# Patient Record
Sex: FEMALE | Race: OTHER
[De-identification: names, ages, dates, MRNs, and addresses within clinical notes are randomized per-mention and may not be internally consistent; named-entity substitution may affect disease eponyms.]

---

## 2020-05-19 ENCOUNTER — HOSPITAL ENCOUNTER (INPATIENT)
Dept: HOSPITAL 54 - ER | Age: 65
LOS: 10 days | Discharge: NURSING, REHAB OR LONG-TERM CARE | DRG: 885 | End: 2020-05-29
Attending: NURSE PRACTITIONER | Admitting: PSYCHIATRY & NEUROLOGY
Payer: MEDICARE

## 2020-05-19 VITALS — SYSTOLIC BLOOD PRESSURE: 102 MMHG | DIASTOLIC BLOOD PRESSURE: 57 MMHG

## 2020-05-19 VITALS — BODY MASS INDEX: 21.99 KG/M2 | HEIGHT: 65 IN | WEIGHT: 132 LBS

## 2020-05-19 DIAGNOSIS — F29: ICD-10-CM

## 2020-05-19 DIAGNOSIS — E44.1: ICD-10-CM

## 2020-05-19 DIAGNOSIS — Z91.14: ICD-10-CM

## 2020-05-19 DIAGNOSIS — M62.562: ICD-10-CM

## 2020-05-19 DIAGNOSIS — Z90.49: ICD-10-CM

## 2020-05-19 DIAGNOSIS — Z73.6: ICD-10-CM

## 2020-05-19 DIAGNOSIS — M20.41: ICD-10-CM

## 2020-05-19 DIAGNOSIS — R26.2: ICD-10-CM

## 2020-05-19 DIAGNOSIS — N39.0: ICD-10-CM

## 2020-05-19 DIAGNOSIS — M20.42: ICD-10-CM

## 2020-05-19 DIAGNOSIS — L60.1: ICD-10-CM

## 2020-05-19 DIAGNOSIS — F60.0: ICD-10-CM

## 2020-05-19 DIAGNOSIS — Z72.0: ICD-10-CM

## 2020-05-19 DIAGNOSIS — M62.561: ICD-10-CM

## 2020-05-19 DIAGNOSIS — F31.64: Primary | ICD-10-CM

## 2020-05-19 DIAGNOSIS — B35.1: ICD-10-CM

## 2020-05-19 LAB
ALBUMIN SERPL BCP-MCNC: 3.7 G/DL (ref 3.4–5)
ALP SERPL-CCNC: 105 U/L (ref 46–116)
ALT SERPL W P-5'-P-CCNC: 18 U/L (ref 12–78)
APAP SERPL-MCNC: 0 UG/ML (ref 10–30)
AST SERPL W P-5'-P-CCNC: 11 U/L (ref 15–37)
BASOPHILS # BLD AUTO: 0.1 /CMM (ref 0–0.2)
BASOPHILS NFR BLD AUTO: 0.8 % (ref 0–2)
BILIRUB DIRECT SERPL-MCNC: 0.1 MG/DL (ref 0–0.2)
BILIRUB SERPL-MCNC: 0.3 MG/DL (ref 0.2–1)
BUN SERPL-MCNC: 13 MG/DL (ref 7–18)
CALCIUM SERPL-MCNC: 9.5 MG/DL (ref 8.5–10.1)
CHLORIDE SERPL-SCNC: 104 MMOL/L (ref 98–107)
CO2 SERPL-SCNC: 27 MMOL/L (ref 21–32)
CREAT SERPL-MCNC: 0.8 MG/DL (ref 0.6–1.3)
EOSINOPHIL NFR BLD AUTO: 1.1 % (ref 0–6)
ETHANOL SERPL-MCNC: < 3 MG/DL (ref 0–0)
GLUCOSE SERPL-MCNC: 86 MG/DL (ref 74–106)
HCT VFR BLD AUTO: 46 % (ref 33–45)
HGB BLD-MCNC: 14.8 G/DL (ref 11.5–14.8)
LYMPHOCYTES NFR BLD AUTO: 2.4 /CMM (ref 0.8–4.8)
LYMPHOCYTES NFR BLD AUTO: 20.8 % (ref 20–44)
MCHC RBC AUTO-ENTMCNC: 32 G/DL (ref 31–36)
MCV RBC AUTO: 93 FL (ref 82–100)
MONOCYTES NFR BLD AUTO: 0.9 /CMM (ref 0.1–1.3)
MONOCYTES NFR BLD AUTO: 8.3 % (ref 2–12)
NEUTROPHILS # BLD AUTO: 7.9 /CMM (ref 1.8–8.9)
NEUTROPHILS NFR BLD AUTO: 69 % (ref 43–81)
PH UR STRIP: 7 [PH] (ref 5–8)
PLATELET # BLD AUTO: 361 /CMM (ref 150–450)
POTASSIUM SERPL-SCNC: 3.6 MMOL/L (ref 3.5–5.1)
PROT SERPL-MCNC: 7.4 G/DL (ref 6.4–8.2)
RBC # BLD AUTO: 4.92 MIL/UL (ref 4–5.2)
RBC #/AREA URNS HPF: (no result) /HPF (ref 0–2)
SALICYLATES SERPL-MCNC: 4.6 MG/DL (ref 2.8–20)
SODIUM SERPL-SCNC: 140 MMOL/L (ref 136–145)
UROBILINOGEN UR STRIP-MCNC: 2 EU/DL
WBC #/AREA URNS HPF: (no result) /HPF (ref 0–3)
WBC NRBC COR # BLD AUTO: 11.4 K/UL (ref 4.3–11)

## 2020-05-19 PROCEDURE — G0480 DRUG TEST DEF 1-7 CLASSES: HCPCS

## 2020-05-19 NOTE — NUR
PATIENT CAME TO ER BED 3 BIB PARAMEDICS FOR MEDICAL CLEARANCE. PATIENT IS ON A 
5150 HOLD SINCE 1600 OF 05- FOR DANGER TO SELF. PATIENT DENIES SUICIDAL 
IDEATION, HOMICIDAL INTENT. PATIENT STATES, "I AM BLEEDING ON THE INSIDE". 
AAOX2. NO SOB. BREATHING EVENLY AND UNLABORED ON ROOM AIR.

## 2020-05-19 NOTE — NUR
GPS RN ADMISSION NOTES: 

ADMITTED 65 Y/O MALE. PT ADMITTED FROM [facility] ER TO GPS UNIT ON A 5150. PER HOLD PTS BROTHER 
CALLED FOR A REQUEST CRISIS EVALUATION DUE TO PT WANDERING AROUND THE STREET WITH NO REGARDS 
OF HER SAFETY. ON THE HOLD SD STATED THAT HER SON IS DEAD. SON IS ACTUALLY ALIVE AND WELL. 
PT EXHIBITS DELUSION STATED THAT SHE HAS BILLIONS OF DOLLARS IN THE BANK. PT IS PARANOID 
SAYING THAT HER BROTHERS ARE STEALING HER MONEY. UPON FACE TO FACE PT IS ANXIOUS, CONFUSED, 
FORGETFUL, PARANOID, NEEDY, COOPERATIVE AT TIMES, DISORGANIZED AND DISHEVELED. PT STATED 
MULTIPLE TIMES THAT SHE HAS A LOT OF MANY AND THAT HER BROTHER IS NOT SUPPORTIVE. PT CLAIMS 
SHE IS ALLERGIC TO EGGS, WHEAT, AND RISPERDAL. ALLERGIES  INPUTTED ON PTS PROFILE.  PT 
JOSE SI AND HI AT THIS TIME. PT REFUSED TO SIGN CONSENT PAPER WORK. ENVIRONMENTAL SAFETY 
CHECK DONE Q15MIN. ENCOURAGE PT TO VERBALIZE CONCERN ANS FEELING TO STAFF. ORIENTED TO THE 
UNIT. BELONGINGS AND CONTRABAND CHECKED AND DONE. NURSING ASSESSMENT DONE. SKIN ASSESSMENT 
IS CHECKED WITH LOWER BACK REDNESS, UPPER BACK SCRATCH, AND BILATERAL LONG TOENAILS. PICTURE 
TAKEN OF PATIENTS FACE FOR IDENTIFICATION PUT IN PTS CHART. PT IS A SMOKER BUT REFUSED 
NICOTINE PATCH ORDER. EXPLAIN RISKS AND BENEFITS. PT STILL REFUSED X3.  PTS RIGHTS DISCUSS 
BY WRITER. PROVIDED THE PT WITH HANDBOOK AND MEDICATION GUIDE. VITALS TAKEN WNL. INITIAL 
BLOOD SUGAR CHECKED. NO S/S OF RESPIRATORY  DISTRESS NOTES. BREATHING EVEN AND UNLABORED. NO 
S/S OF SOB. CALL BELL WITHIN REACH. BED LOCKED AND LOWEST POSITION. KEPT CLEAN AND DRY. 
CONTINUE TO MONITOR.

## 2020-05-20 VITALS — SYSTOLIC BLOOD PRESSURE: 100 MMHG | DIASTOLIC BLOOD PRESSURE: 64 MMHG

## 2020-05-20 VITALS — SYSTOLIC BLOOD PRESSURE: 118 MMHG | DIASTOLIC BLOOD PRESSURE: 67 MMHG

## 2020-05-20 VITALS — DIASTOLIC BLOOD PRESSURE: 79 MMHG | SYSTOLIC BLOOD PRESSURE: 123 MMHG

## 2020-05-20 LAB
CHOLEST SERPL-MCNC: 167 MG/DL (ref ?–200)
CREAT SERPL-MCNC: 0.8 MG/DL (ref 0.6–1.3)
HDLC SERPL-MCNC: 49 MG/DL (ref 40–60)
LDLC SERPL DIRECT ASSAY-MCNC: 97 MG/DL (ref 0–99)
TRIGL SERPL-MCNC: 156 MG/DL (ref 30–150)

## 2020-05-20 RX ADMIN — OLANZAPINE SCH MG: 5 TABLET, FILM COATED ORAL at 21:02

## 2020-05-20 RX ADMIN — LITHIUM CARBONATE SCH MG: 300 TABLET, FILM COATED, EXTENDED RELEASE ORAL at 21:03

## 2020-05-20 NOTE — NUR
Family Contact: Pts brother, Dylan ([phone]), called the SW back and SW attempted to go 
over the pts hospitalization, treatment plan and initial discharge plan. SW attempted to 
confirm information on the hold which was worded poorly and apologized for the confusion 
regarding the pts son. In fact, the pts son is alive and well but the pt had thought that he 
was dead. SW informed the pts brother that as today she knows that her son is alive and that 
he is in [facility]. SW attempted to ask the pts brother about the pts living 
situation and inquired if she can return to living with him. Pts brother appeared to be 
agitated and stated that he cannot believe she is being discharged already. SW stated that 
she is not being discharged but that the SW needs to start planning for her discharge in the 
case that placement will be necessary. Pts brother stated that he did not want to discuss 
anything anymore and stated that the pts other brother will call and talk to the SW instead.

## 2020-05-20 NOTE — NUR
WOUND CARE CONSULT: PT PRESENTS WITH VERY LONG CURLING TOENAILS, PRESENT ON ADMISSION. 
RECOMMEND DPM CONSULT. DR HENDERSON NOTIFIED OF CONSULT REQUEST. WILL SEE PRN.

## 2020-05-20 NOTE — NUR
Family Contact: SW called the pts brother, Dylan ([phone]), and left a voicemail stating 
that the SW would like to discuss the pts treatment and discharge plan.

## 2020-05-20 NOTE — NUR
Family Contact: Pts brother, Alfonso ([phone]), called the SW and stated that he can be the 
point of contact regarding this pt. He stated that out of all the placement options that the 
SW informed him about, SNF, sounds the most appropriate for the pt. He requested placement 
in the [facility].

## 2020-05-20 NOTE — NUR
Initial Discharge Plan: Pt currently resides at her brother's home located at [address] [place] [address]. Per pt, she is unsure if she wants to return. SW will work 
with the pt and the MD regarding appropriate discharge planning. SW will form a safe and 
proper discharge plan.

## 2020-05-21 VITALS — DIASTOLIC BLOOD PRESSURE: 55 MMHG | SYSTOLIC BLOOD PRESSURE: 113 MMHG

## 2020-05-21 VITALS — SYSTOLIC BLOOD PRESSURE: 116 MMHG | DIASTOLIC BLOOD PRESSURE: 77 MMHG

## 2020-05-21 VITALS — DIASTOLIC BLOOD PRESSURE: 75 MMHG | SYSTOLIC BLOOD PRESSURE: 102 MMHG

## 2020-05-21 RX ADMIN — LITHIUM CARBONATE SCH MG: 300 TABLET, FILM COATED, EXTENDED RELEASE ORAL at 09:40

## 2020-05-21 RX ADMIN — OLANZAPINE SCH MG: 5 TABLET, FILM COATED ORAL at 20:29

## 2020-05-21 RX ADMIN — CEPHALEXIN SCH MG: 500 CAPSULE ORAL at 20:29

## 2020-05-21 RX ADMIN — LITHIUM CARBONATE SCH MG: 300 TABLET, FILM COATED, EXTENDED RELEASE ORAL at 16:34

## 2020-05-21 RX ADMIN — LITHIUM CARBONATE SCH MG: 300 TABLET, FILM COATED, EXTENDED RELEASE ORAL at 12:34

## 2020-05-21 RX ADMIN — OLANZAPINE SCH MG: 5 TABLET, FILM COATED ORAL at 09:40

## 2020-05-21 RX ADMIN — CEPHALEXIN SCH MG: 500 CAPSULE ORAL at 09:40

## 2020-05-21 NOTE — NUR
Patient is alert and oriented. Able to communicate her needs. Complained of soiled pant 
provided by the hospital. New pant given. Offered a pitcher of water. Complaint with 
medication and all unit rules at this time. Will continue to monitor behavior and medication 
effectiveness.

## 2020-05-21 NOTE — NUR
INDIVIDUAL MEETING: MONY met with pt to speak with pt regarding her discharge plan. SW was 
informed by RN that pt wanted to return to her brother house. SW informed pt that per her 
brother Alfonso she is unable to return to her brothers Dylan's house and informed her that Alfonso 
has mentioned that a SNF will be a more appropriate place for pt. Pt became agitated and 
began saying that she refuses to go to a SNF and stated that her brothers are stealing her 
money and that she will find her own place to go to. SW stated that she needed to discuss 
returning to her brothers house with her family and pt stated that she was going to call 
them so they can return her money and give her her belongings. MONY will coordinate the best 
appropriate discharge plan with pt and her brother Alfonso.

## 2020-05-21 NOTE — NUR
INDIVIDUAL COUNSELING: SW attempted to meet with pt however, pt is paranoid and focused on 
her brother stealing from her. Pt then stated that SW needed to call the  and report 
brother. SW attempted to redirect pts thoughts and pt became agitated. Pt is not appropriate 
for individual counseling at this time.

## 2020-05-22 VITALS — SYSTOLIC BLOOD PRESSURE: 107 MMHG | DIASTOLIC BLOOD PRESSURE: 62 MMHG

## 2020-05-22 VITALS — SYSTOLIC BLOOD PRESSURE: 89 MMHG | DIASTOLIC BLOOD PRESSURE: 52 MMHG

## 2020-05-22 VITALS — DIASTOLIC BLOOD PRESSURE: 36 MMHG | SYSTOLIC BLOOD PRESSURE: 100 MMHG

## 2020-05-22 VITALS — SYSTOLIC BLOOD PRESSURE: 100 MMHG | DIASTOLIC BLOOD PRESSURE: 54 MMHG

## 2020-05-22 RX ADMIN — ACETAMINOPHEN PRN MG: 325 TABLET ORAL at 19:46

## 2020-05-22 RX ADMIN — LITHIUM CARBONATE SCH MG: 300 TABLET, FILM COATED, EXTENDED RELEASE ORAL at 13:35

## 2020-05-22 RX ADMIN — CEPHALEXIN SCH MG: 500 CAPSULE ORAL at 08:20

## 2020-05-22 RX ADMIN — LITHIUM CARBONATE SCH MG: 300 TABLET, FILM COATED, EXTENDED RELEASE ORAL at 16:31

## 2020-05-22 RX ADMIN — LITHIUM CARBONATE SCH MG: 300 TABLET, FILM COATED, EXTENDED RELEASE ORAL at 08:20

## 2020-05-22 RX ADMIN — OLANZAPINE SCH MG: 5 TABLET, FILM COATED ORAL at 19:46

## 2020-05-22 RX ADMIN — CEPHALEXIN SCH MG: 500 CAPSULE ORAL at 20:06

## 2020-05-22 RX ADMIN — OLANZAPINE SCH MG: 5 TABLET, FILM COATED ORAL at 08:20

## 2020-05-23 VITALS — DIASTOLIC BLOOD PRESSURE: 59 MMHG | SYSTOLIC BLOOD PRESSURE: 113 MMHG

## 2020-05-23 VITALS — DIASTOLIC BLOOD PRESSURE: 58 MMHG | SYSTOLIC BLOOD PRESSURE: 110 MMHG

## 2020-05-23 VITALS — DIASTOLIC BLOOD PRESSURE: 56 MMHG | SYSTOLIC BLOOD PRESSURE: 100 MMHG

## 2020-05-23 RX ADMIN — LITHIUM CARBONATE SCH MG: 300 TABLET, FILM COATED, EXTENDED RELEASE ORAL at 08:19

## 2020-05-23 RX ADMIN — LITHIUM CARBONATE SCH MG: 300 TABLET, FILM COATED, EXTENDED RELEASE ORAL at 12:10

## 2020-05-23 RX ADMIN — OLANZAPINE SCH MG: 5 TABLET, FILM COATED ORAL at 08:19

## 2020-05-23 RX ADMIN — OLANZAPINE SCH MG: 5 TABLET, FILM COATED ORAL at 20:40

## 2020-05-23 RX ADMIN — CEPHALEXIN SCH MG: 500 CAPSULE ORAL at 08:19

## 2020-05-23 RX ADMIN — CEPHALEXIN SCH MG: 500 CAPSULE ORAL at 20:40

## 2020-05-23 RX ADMIN — LITHIUM CARBONATE SCH MG: 300 TABLET, FILM COATED, EXTENDED RELEASE ORAL at 16:35

## 2020-05-24 VITALS — DIASTOLIC BLOOD PRESSURE: 51 MMHG | SYSTOLIC BLOOD PRESSURE: 104 MMHG

## 2020-05-24 VITALS — DIASTOLIC BLOOD PRESSURE: 58 MMHG | SYSTOLIC BLOOD PRESSURE: 103 MMHG

## 2020-05-24 VITALS — SYSTOLIC BLOOD PRESSURE: 102 MMHG | DIASTOLIC BLOOD PRESSURE: 51 MMHG

## 2020-05-24 LAB
ALBUMIN SERPL BCP-MCNC: 3.1 G/DL (ref 3.4–5)
ALP SERPL-CCNC: 80 U/L (ref 46–116)
ALT SERPL W P-5'-P-CCNC: 19 U/L (ref 12–78)
AST SERPL W P-5'-P-CCNC: 11 U/L (ref 15–37)
BASOPHILS # BLD AUTO: 0.1 /CMM (ref 0–0.2)
BASOPHILS NFR BLD AUTO: 1 % (ref 0–2)
BILIRUB SERPL-MCNC: 0.3 MG/DL (ref 0.2–1)
BUN SERPL-MCNC: 21 MG/DL (ref 7–18)
CALCIUM SERPL-MCNC: 9.1 MG/DL (ref 8.5–10.1)
CHLORIDE SERPL-SCNC: 105 MMOL/L (ref 98–107)
CO2 SERPL-SCNC: 24 MMOL/L (ref 21–32)
CREAT SERPL-MCNC: 0.6 MG/DL (ref 0.6–1.3)
EOSINOPHIL NFR BLD AUTO: 3.2 % (ref 0–6)
GLUCOSE SERPL-MCNC: 66 MG/DL (ref 74–106)
HCT VFR BLD AUTO: 42 % (ref 33–45)
HGB BLD-MCNC: 13.8 G/DL (ref 11.5–14.8)
LYMPHOCYTES NFR BLD AUTO: 2.5 /CMM (ref 0.8–4.8)
LYMPHOCYTES NFR BLD AUTO: 30.7 % (ref 20–44)
MCHC RBC AUTO-ENTMCNC: 33 G/DL (ref 31–36)
MCV RBC AUTO: 93 FL (ref 82–100)
MONOCYTES NFR BLD AUTO: 0.8 /CMM (ref 0.1–1.3)
MONOCYTES NFR BLD AUTO: 10.3 % (ref 2–12)
NEUTROPHILS # BLD AUTO: 4.4 /CMM (ref 1.8–8.9)
NEUTROPHILS NFR BLD AUTO: 54.8 % (ref 43–81)
PLATELET # BLD AUTO: 328 /CMM (ref 150–450)
POTASSIUM SERPL-SCNC: 4.5 MMOL/L (ref 3.5–5.1)
PROT SERPL-MCNC: 6.4 G/DL (ref 6.4–8.2)
RBC # BLD AUTO: 4.54 MIL/UL (ref 4–5.2)
SODIUM SERPL-SCNC: 138 MMOL/L (ref 136–145)
WBC NRBC COR # BLD AUTO: 8.1 K/UL (ref 4.3–11)

## 2020-05-24 RX ADMIN — LITHIUM CARBONATE SCH MG: 300 TABLET, FILM COATED, EXTENDED RELEASE ORAL at 12:20

## 2020-05-24 RX ADMIN — LITHIUM CARBONATE SCH MG: 300 TABLET, FILM COATED, EXTENDED RELEASE ORAL at 08:18

## 2020-05-24 RX ADMIN — CEPHALEXIN SCH MG: 500 CAPSULE ORAL at 20:22

## 2020-05-24 RX ADMIN — ACETAMINOPHEN PRN MG: 325 TABLET ORAL at 13:15

## 2020-05-24 RX ADMIN — OLANZAPINE SCH MG: 5 TABLET, FILM COATED ORAL at 20:22

## 2020-05-24 RX ADMIN — ACETAMINOPHEN PRN MG: 325 TABLET ORAL at 22:19

## 2020-05-24 RX ADMIN — ACETAMINOPHEN PRN MG: 325 TABLET ORAL at 06:44

## 2020-05-24 RX ADMIN — OLANZAPINE SCH MG: 5 TABLET, FILM COATED ORAL at 08:18

## 2020-05-24 RX ADMIN — LITHIUM CARBONATE SCH MG: 300 TABLET, FILM COATED, EXTENDED RELEASE ORAL at 16:54

## 2020-05-24 RX ADMIN — CEPHALEXIN SCH MG: 500 CAPSULE ORAL at 08:18

## 2020-05-24 NOTE — NUR
GPS RN NOTES: C/O OF BACK PAIN

PT C/O OF 3/10 LOWER BACK PAIN. PT STATED, " MY LOWER BACK HURTS, BUT I THINK ITS BECAUSE I 
LAY ON ONES SIDE."  PT REQUESTED TYLENOL. PT STATED SHE HAS NO HEADACHE. OFFERED TYLENOL PRN 
PO PER ORDER. PT AGREED AND TOLERATED MEDICATION WELL. CONTINUE TO MONITOR.

## 2020-05-24 NOTE — NUR
GPS RN NOTES: C/O OF BACK PAIN

PT C/O OF 3/10 LOWER BACK PAIN. PT REQUESTED TYLENOL. OFFERED TYLENOL PRN PO PER ORDER. PT 
AGREED AND TOLERATED MEDICATION WELL. CONTINUE TO MONITOR.

## 2020-05-24 NOTE — NUR
GPS RN NOTE;

PT IN THE ROOM AWAKE, ALERT AND  ORIENTED X2.COOPERATIVE,COMPLIANT WITH MEDICATIONS, 
ANXIOUS, DISORGANIZED. NO  S/S OF ANY DISTRESS. RESPIRATION EVEN AND UNLABORED WITH EQUAL 
RISE AND FALL OF THE CHEST, ON ROOM AIR. PT DENIES SI AT THIS TIME.  SAFETY AND FALL 
PRECAUTION OBSERVED, CALL BELL WITHIN REACH. Q15 MINUTES OBSERVATION CONTINUED. WILL 
CONTINUE TO MONITOR PT FOR MOOD, SAFETY AND BEHAVIOR.

## 2020-05-24 NOTE — NUR
GPS RN NOTES: REFUSED WEEKLY SKIN ASSESSMENT/ PICTURE 

PT REFUSED WEEKLY SKIN ASSESSMENT AND PICTURES. PT ANXIOUS AND STATED, " NO. I DON'T FEEL 
COMFORTABLE TAKING PICTURES. PICTURES ARE TOO MUCH. " EXPLAIN RISKS AND BENEFITS. PT STILL 
REFUSED X3. CONTINUE TO MONITOR

## 2020-05-25 VITALS — SYSTOLIC BLOOD PRESSURE: 103 MMHG | DIASTOLIC BLOOD PRESSURE: 56 MMHG

## 2020-05-25 VITALS — DIASTOLIC BLOOD PRESSURE: 51 MMHG | SYSTOLIC BLOOD PRESSURE: 95 MMHG

## 2020-05-25 VITALS — SYSTOLIC BLOOD PRESSURE: 103 MMHG | DIASTOLIC BLOOD PRESSURE: 50 MMHG

## 2020-05-25 VITALS — DIASTOLIC BLOOD PRESSURE: 56 MMHG | SYSTOLIC BLOOD PRESSURE: 112 MMHG

## 2020-05-25 VITALS — DIASTOLIC BLOOD PRESSURE: 50 MMHG | SYSTOLIC BLOOD PRESSURE: 103 MMHG

## 2020-05-25 RX ADMIN — ACETAMINOPHEN PRN MG: 325 TABLET ORAL at 07:11

## 2020-05-25 RX ADMIN — LITHIUM CARBONATE SCH MG: 300 TABLET, FILM COATED, EXTENDED RELEASE ORAL at 12:21

## 2020-05-25 RX ADMIN — CEPHALEXIN SCH MG: 500 CAPSULE ORAL at 09:01

## 2020-05-25 RX ADMIN — OLANZAPINE SCH MG: 5 TABLET, FILM COATED ORAL at 20:41

## 2020-05-25 RX ADMIN — ACETAMINOPHEN PRN MG: 325 TABLET ORAL at 17:56

## 2020-05-25 RX ADMIN — OLANZAPINE SCH MG: 5 TABLET, FILM COATED ORAL at 09:01

## 2020-05-25 RX ADMIN — LITHIUM CARBONATE SCH MG: 300 TABLET, FILM COATED, EXTENDED RELEASE ORAL at 09:01

## 2020-05-25 RX ADMIN — CEPHALEXIN SCH MG: 500 CAPSULE ORAL at 20:41

## 2020-05-25 RX ADMIN — LITHIUM CARBONATE SCH MG: 300 TABLET, FILM COATED, EXTENDED RELEASE ORAL at 16:17

## 2020-05-25 NOTE — NUR
GPS RN NOTE:

Patient c/o of pain in lower  back. Requested Tylenol. Tylenol 650mg PRN PO administered as 
ordered. Will continue to monitor

## 2020-05-26 VITALS — DIASTOLIC BLOOD PRESSURE: 60 MMHG | SYSTOLIC BLOOD PRESSURE: 113 MMHG

## 2020-05-26 VITALS — SYSTOLIC BLOOD PRESSURE: 113 MMHG | DIASTOLIC BLOOD PRESSURE: 59 MMHG

## 2020-05-26 VITALS — DIASTOLIC BLOOD PRESSURE: 54 MMHG | SYSTOLIC BLOOD PRESSURE: 99 MMHG

## 2020-05-26 RX ADMIN — LITHIUM CARBONATE SCH MG: 300 TABLET, FILM COATED, EXTENDED RELEASE ORAL at 08:33

## 2020-05-26 RX ADMIN — ACETAMINOPHEN PRN MG: 325 TABLET ORAL at 04:36

## 2020-05-26 RX ADMIN — LITHIUM CARBONATE SCH MG: 300 TABLET, FILM COATED, EXTENDED RELEASE ORAL at 12:01

## 2020-05-26 RX ADMIN — CEPHALEXIN SCH MG: 500 CAPSULE ORAL at 08:34

## 2020-05-26 RX ADMIN — ACETAMINOPHEN PRN MG: 325 TABLET ORAL at 20:20

## 2020-05-26 RX ADMIN — ACETAMINOPHEN PRN MG: 325 TABLET ORAL at 11:49

## 2020-05-26 RX ADMIN — CEPHALEXIN SCH MG: 500 CAPSULE ORAL at 21:49

## 2020-05-26 RX ADMIN — OLANZAPINE SCH MG: 5 TABLET, FILM COATED ORAL at 08:34

## 2020-05-26 RX ADMIN — OLANZAPINE SCH MG: 5 TABLET, FILM COATED ORAL at 20:07

## 2020-05-26 RX ADMIN — LITHIUM CARBONATE SCH MG: 300 TABLET, FILM COATED, EXTENDED RELEASE ORAL at 16:25

## 2020-05-26 NOTE — NUR
Family Contact: MONY called the pts brother, Alfonso ([phone]), and informed him that the pt 
was not released from her hold from the  Hearing. MONY then expressed to him that the pt 
will be discharged from the hospital once she is stable and there is placement.

## 2020-05-26 NOTE — NUR
SNF Referral: SW faxed a referral to the following four facilities:



[facility] with attn to Admissions to the fax number: [phone]

[facility] with attn to Esequiel to the fax number: [phone]

[facility] with attn Admissions to the fax number: [phone]

[facility] with attn to Jaqueline to the fax number: 
[phone]

## 2020-05-26 NOTE — NUR
Probable Cause (PC) Hearing Notification: SW called the pts brother, Alfonso ([phone]), and 
informed him about the SW sending out the referrals for placement and explained what the 
hearing entails. SW informed him that she will call him once the hearing is concluded.

## 2020-05-26 NOTE — NUR
RN NOTE- PT ALERT ORIENTED TO PERSON PLACE DENIES SI HI AH VH CONCERNED W RETURNING TO PLACE 
SHE WAS LIVING AT. TOLD SW CAN TALK TO HER AND PT STATED 'IM NOT TALKING TO ANYONE TODAY' 
NEEDS ATTENDED. MED COMPLIANT, PO INTAKE GOOD.

## 2020-05-26 NOTE — NUR
GPS RN NOTES: C/O OF BACK PAIN

PT C/O OF 3/10 LOWER BACK PAIN. PT STATED, "CAN I HAVE TYLENOL? ITS BEEN WORKING SO FAR. I 
THINK I SLEPT ON A DIFFERENT POSITION."  PT STATED SHE HAS NO HEADACHE. OFFERED TYLENOL PRN 
PO PER ORDER. PT AGREED AND TOLERATED MEDICATION WELL. CONTINUE TO MONITOR.

## 2020-05-26 NOTE — NUR
Individual Intervention: SW met with the pt to discuss her discharge planning. Pt repeatedly 
stated that she did not want to be discharged to a nursing facility. SW explained that she 
has rights but unfortunately because she lives in her brother's home and he does not feel 
comfortable with her being there, she cannot be discharged there. SW had to repeat that 
information as the pt was not understanding why she could not go back to the brother's home. 
Pt stated that she has a lot of family drama that she feels that she needs resolved so that 
her discharge is not affected. SW stated that she recommends a conversation and then she 
will follow up regarding discharge planning.

## 2020-05-27 VITALS — DIASTOLIC BLOOD PRESSURE: 58 MMHG | SYSTOLIC BLOOD PRESSURE: 104 MMHG

## 2020-05-27 VITALS — DIASTOLIC BLOOD PRESSURE: 76 MMHG | SYSTOLIC BLOOD PRESSURE: 109 MMHG

## 2020-05-27 VITALS — SYSTOLIC BLOOD PRESSURE: 107 MMHG | DIASTOLIC BLOOD PRESSURE: 62 MMHG

## 2020-05-27 RX ADMIN — CEPHALEXIN SCH MG: 500 CAPSULE ORAL at 09:50

## 2020-05-27 RX ADMIN — OLANZAPINE SCH MG: 5 TABLET, FILM COATED ORAL at 20:36

## 2020-05-27 RX ADMIN — LITHIUM CARBONATE SCH MG: 300 TABLET, FILM COATED, EXTENDED RELEASE ORAL at 09:50

## 2020-05-27 RX ADMIN — LITHIUM CARBONATE SCH MG: 300 TABLET, FILM COATED, EXTENDED RELEASE ORAL at 13:58

## 2020-05-27 RX ADMIN — OLANZAPINE SCH MG: 5 TABLET, FILM COATED ORAL at 09:50

## 2020-05-27 RX ADMIN — ACETAMINOPHEN PRN MG: 325 TABLET ORAL at 04:15

## 2020-05-27 RX ADMIN — ACETAMINOPHEN PRN MG: 325 TABLET ORAL at 21:39

## 2020-05-27 RX ADMIN — LITHIUM CARBONATE SCH MG: 300 TABLET, FILM COATED, EXTENDED RELEASE ORAL at 17:58

## 2020-05-27 NOTE — NUR
SNF Contact: SW contacted CJ from [facility] ([phone]) who 
stated that the pt was denied admission due to wandering behavior and lighting cigarettes in 
the house. SW stated that was the pts behavior prior to hospital admission and medication 
changes. CJ asked the SW send additional notes so the SW faxed them to [phone].

## 2020-05-27 NOTE — NUR
Family Contact: SW called the pts brother, Alfonso ([phone]), and informed him of the 
conversation that the pt and how the pt could not comprehend that she cannot go back to her 
brother's house. SW encouraged him to have a conversation with the pt along with the pts 
other brother that she was living with. SW then informed him that she is following up with 
the placements where referrals were sent. MONY explained that it has been difficult to find 
placement due to COVID. He stated that he will call the SW back at a more convenient time to 
get the names of the facilities that the pt has been referred to.

## 2020-05-27 NOTE — NUR
SNF Referral: MONY faxed a referral to [facility] with attn to Elena to the fax 
number: [phone].

## 2020-05-27 NOTE — NUR
SNF Contact: SW called [facility] [phone] and left a voicemail with 
the admissions department inquiring about the pts referral.

## 2020-05-27 NOTE — NUR
SNF Contact: SW called [facility] [phone] and left a voicemail message 
about the referral that was sent the previous day with the admissions department.

## 2020-05-28 VITALS — SYSTOLIC BLOOD PRESSURE: 99 MMHG | DIASTOLIC BLOOD PRESSURE: 47 MMHG

## 2020-05-28 VITALS — DIASTOLIC BLOOD PRESSURE: 58 MMHG | SYSTOLIC BLOOD PRESSURE: 102 MMHG

## 2020-05-28 VITALS — SYSTOLIC BLOOD PRESSURE: 107 MMHG | DIASTOLIC BLOOD PRESSURE: 58 MMHG

## 2020-05-28 RX ADMIN — OLANZAPINE SCH MG: 5 TABLET, FILM COATED ORAL at 08:31

## 2020-05-28 RX ADMIN — OLANZAPINE SCH MG: 5 TABLET, FILM COATED ORAL at 20:52

## 2020-05-28 RX ADMIN — LITHIUM CARBONATE SCH MG: 300 TABLET, FILM COATED, EXTENDED RELEASE ORAL at 08:31

## 2020-05-28 RX ADMIN — ACETAMINOPHEN PRN MG: 325 TABLET ORAL at 11:24

## 2020-05-28 RX ADMIN — ACETAMINOPHEN PRN MG: 325 TABLET ORAL at 05:02

## 2020-05-28 RX ADMIN — LITHIUM CARBONATE SCH MG: 300 TABLET, FILM COATED, EXTENDED RELEASE ORAL at 16:29

## 2020-05-28 RX ADMIN — LITHIUM CARBONATE SCH MG: 300 TABLET, FILM COATED, EXTENDED RELEASE ORAL at 12:04

## 2020-05-28 NOTE — NUR
Individual Intervention: SW met with the pt at bedside to discuss her discharge planning. Pt 
stated that she is going to go home to her brother's house with a  to get her 
belongings. SW then asked her where she would live and the pt was not able to state a viable 
plan. SW went over the plan of the pt being discharged to a SNF for a temporary amount of 
time. Pt stated that she would think about it.

## 2020-05-28 NOTE — NUR
Family Contact: SW called the pts brother, Alfonso ([phone]), and informed him that the pt 
is being discharged the following day and that there was only one facility that accepted the 
pt out of the many referrals that were sent. The pts brother appeared to become agitated at 
the distance of the accepting facility. SW explained that the facilities in the [facility] are not equipped for psychiatric patients even though she is considered to be stable 
by the MD at this point. Pts brother questioned the SW about Board and Care and SW stated 
that at this point the pt needs a locked facility and she needs a higher level of care than 
a Board and Care. Pts brother stated that he needed to think through this decision and that 
he will call the SW back.

## 2020-05-28 NOTE — NUR
SNF Contact: Viola ([phone]) from [facility] called the SW and the SW 
informed her that the pt is going to be discharged the following day and that the pts 
brother needs a call so that his questions can get answered.

## 2020-05-28 NOTE — NUR
Individual Counseling: This SW met with the pt. at [facility] to facilitate counseling regarding 
positive coping mechanisms. The patient was asleep and did not respond to verbal cues. Pt. 
will be invited to attend future therapeutic milieu.

## 2020-05-28 NOTE — NUR
Family Contact: SW called the pts brother, Alfonso ([phone]), and he stated that he spoke 
to [facility] and was wondering if the SW had any placements in [place]. MONY 
stated that she did but informed him it would take a couple of days to send the pt because 
they will need a COVID test and the pt is set to be discharged tomorrow.

## 2020-05-29 VITALS — SYSTOLIC BLOOD PRESSURE: 90 MMHG | DIASTOLIC BLOOD PRESSURE: 45 MMHG

## 2020-05-29 RX ADMIN — OLANZAPINE SCH MG: 5 TABLET, FILM COATED ORAL at 08:01

## 2020-05-29 RX ADMIN — ACETAMINOPHEN PRN MG: 325 TABLET ORAL at 04:24

## 2020-05-29 RX ADMIN — LITHIUM CARBONATE SCH MG: 300 TABLET, FILM COATED, EXTENDED RELEASE ORAL at 08:01

## 2020-05-29 RX ADMIN — LITHIUM CARBONATE SCH MG: 300 TABLET, FILM COATED, EXTENDED RELEASE ORAL at 12:44

## 2020-05-29 NOTE — NUR
Discharge Note: Pt was discharged to [facility] Address: [address], [place] [address] Phone: [phone]. Pt was transported via [facility] at 2pm. Pts 
brother, Alfonso ([phone]) has been notified of the discharge. Upon discharge, the pt 
appeared to be in a dysphoric mood and presented with an anxious affect. Pt appeared to be 
alert and oriented x4 (time, place, self and situation). Pt denied both suicidal and 
homicidal ideation as well as auditory and visual hallucinations. Pt appears to be 
ambulatory with a steady gait. Pt appears to be groomed and appropriately dressed. Pt will 
continue to be under the care of psychiatrist, Dr. Heath, located at [address] [facility] [address] [place] [address]; [phone].  Pt will also be under the care of 
internist, Dr Wen, located at [address] [place] [address] 
[phone]. Pt signed the Choice of Vendor form.

## 2020-05-29 NOTE — NUR
RN  DISCHARGE NOTE- PT DC TO SKILLED NURSING FACILITY [facility] AT THIS TIME 
VIA AMBULANCE AND GURNEY. REPORT CALLED TO BONY AT FACILITY. DC INSTRUCTIONS REVIEWED AND 
SIGNED, AMBULANCE STAFF REVIEWED AS WELL. VS STABLE, DENIES SI HI AH VH AT PRESENT TIME, 
VALUABLES RETURNED AND SIGNED FOR. ID [email or web address] REMOVED FROM PT AND ESCORTED OFF UNIT BY 
STAFF.

## 2020-05-29 NOTE — NUR
RN NOTE- PT IN ROOM, TALKING ABOUT LEAVING AND DC. STATES SHE FEELS 'WEIRD' SOMETIMES FROM 
RX. VS STABLE. NEEDS ATTENDED, PO INTAKE GOOD, DENIES SI HI AH VH

## 2020-05-29 NOTE — NUR
Family Contact: Pts brother, Alfonso ([phone]), called the SW and stated that the pt can be 
discharged to [facility] today. He stated that he spoke to his brother and they 
agreed on the placement. SW stated that she will arrange the discharge.

## 2020-05-29 NOTE — NUR
Individual Counseling: This SW met with the pt. at [facility] to facilitate 1on1 counseling. 
However, pt. was sleeping and was not rousable by verbal cues. Pt. will be invited to attend 
future therapeutic milieu. 

-------------------------------------------------------------------------------

Addendum: 05/29/20 at 1445 by JAMES SYKES

-------------------------------------------------------------------------------

Individual Counseling held at 1345*

## 2020-07-23 ENCOUNTER — HOSPITAL ENCOUNTER (INPATIENT)
Dept: HOSPITAL 54 - ER | Age: 65
LOS: 12 days | Discharge: NURSING, REHAB OR LONG-TERM CARE | DRG: 885 | End: 2020-08-04
Attending: NURSE PRACTITIONER | Admitting: PSYCHIATRY & NEUROLOGY
Payer: MEDICARE

## 2020-07-23 VITALS — BODY MASS INDEX: 21.99 KG/M2 | HEIGHT: 65 IN | WEIGHT: 132 LBS

## 2020-07-23 DIAGNOSIS — E44.1: ICD-10-CM

## 2020-07-23 DIAGNOSIS — N39.0: ICD-10-CM

## 2020-07-23 DIAGNOSIS — G93.41: ICD-10-CM

## 2020-07-23 DIAGNOSIS — B96.89: ICD-10-CM

## 2020-07-23 DIAGNOSIS — Z91.19: ICD-10-CM

## 2020-07-23 DIAGNOSIS — N17.9: ICD-10-CM

## 2020-07-23 DIAGNOSIS — F17.200: ICD-10-CM

## 2020-07-23 DIAGNOSIS — Z59.0: ICD-10-CM

## 2020-07-23 DIAGNOSIS — F31.5: Primary | ICD-10-CM

## 2020-07-23 DIAGNOSIS — F29: ICD-10-CM

## 2020-07-23 DIAGNOSIS — Z86.59: ICD-10-CM

## 2020-07-23 DIAGNOSIS — Z90.49: ICD-10-CM

## 2020-07-23 DIAGNOSIS — Z73.6: ICD-10-CM

## 2020-07-23 DIAGNOSIS — Z88.9: ICD-10-CM

## 2020-07-23 DIAGNOSIS — G31.84: ICD-10-CM

## 2020-07-23 LAB
ALBUMIN SERPL BCP-MCNC: 3.3 G/DL (ref 3.4–5)
ALP SERPL-CCNC: 87 U/L (ref 46–116)
ALT SERPL W P-5'-P-CCNC: 15 U/L (ref 12–78)
APAP SERPL-MCNC: < 2 UG/ML (ref 10–30)
AST SERPL W P-5'-P-CCNC: 11 U/L (ref 15–37)
BASOPHILS # BLD AUTO: 0 /CMM (ref 0–0.2)
BASOPHILS NFR BLD AUTO: 0.5 % (ref 0–2)
BILIRUB DIRECT SERPL-MCNC: 0.1 MG/DL (ref 0–0.2)
BILIRUB SERPL-MCNC: 0.2 MG/DL (ref 0.2–1)
BUN SERPL-MCNC: 23 MG/DL (ref 7–18)
CALCIUM SERPL-MCNC: 9.2 MG/DL (ref 8.5–10.1)
CHLORIDE SERPL-SCNC: 110 MMOL/L (ref 98–107)
CO2 SERPL-SCNC: 26 MMOL/L (ref 21–32)
CREAT SERPL-MCNC: 0.8 MG/DL (ref 0.6–1.3)
EOSINOPHIL NFR BLD AUTO: 2.9 % (ref 0–6)
ETHANOL SERPL-MCNC: < 3 MG/DL (ref 0–0)
GLUCOSE SERPL-MCNC: 103 MG/DL (ref 74–106)
HCT VFR BLD AUTO: 37 % (ref 33–45)
HGB BLD-MCNC: 12 G/DL (ref 11.5–14.8)
LYMPHOCYTES NFR BLD AUTO: 2.4 /CMM (ref 0.8–4.8)
LYMPHOCYTES NFR BLD AUTO: 27.9 % (ref 20–44)
MCHC RBC AUTO-ENTMCNC: 33 G/DL (ref 31–36)
MCV RBC AUTO: 94 FL (ref 82–100)
MONOCYTES NFR BLD AUTO: 1 /CMM (ref 0.1–1.3)
MONOCYTES NFR BLD AUTO: 11.3 % (ref 2–12)
NEUTROPHILS # BLD AUTO: 5 /CMM (ref 1.8–8.9)
NEUTROPHILS NFR BLD AUTO: 57.4 % (ref 43–81)
PH UR STRIP: 7 [PH] (ref 5–8)
PLATELET # BLD AUTO: 299 /CMM (ref 150–450)
POTASSIUM SERPL-SCNC: 4.3 MMOL/L (ref 3.5–5.1)
PROT SERPL-MCNC: 6.5 G/DL (ref 6.4–8.2)
RBC # BLD AUTO: 3.88 MIL/UL (ref 4–5.2)
RBC #/AREA URNS HPF: (no result) /HPF (ref 0–2)
SALICYLATES SERPL-MCNC: 3.3 MG/DL (ref 2.8–20)
SODIUM SERPL-SCNC: 140 MMOL/L (ref 136–145)
UROBILINOGEN UR STRIP-MCNC: 1 EU/DL
WBC #/AREA URNS HPF: (no result) /HPF (ref 0–3)
WBC NRBC COR # BLD AUTO: 8.6 K/UL (ref 4.3–11)

## 2020-07-23 PROCEDURE — G0480 DRUG TEST DEF 1-7 CLASSES: HCPCS

## 2020-07-23 PROCEDURE — A6253 ABSORPT DRG > 48 SQ IN W/O B: HCPCS

## 2020-07-23 SDOH — ECONOMIC STABILITY - HOUSING INSECURITY: HOMELESSNESS: Z59.0

## 2020-07-23 NOTE — NUR
AVA FROM [facility]. TO ER BED 12. AAOX1. NOT IN RESP DISTRESS. BROUGHT 
IN FOR INCREASED HALLUCINATIONS AND CONFUSION. DURING ASSESSMENT, PT WAS NOTED 
LOOKING IN OTHER DIRECTION AND SEEMS TO BE TALKING TO SOMEBODY WHILE BEING 
SPOKEN TO. PT IS NOTED WITH CONFUSION. DENIES ANY THOUGHTS OF HARMING SELF AND 
OTHERS. NED ARRIAZA WAS AT THE BEDSIDE FOR EVAL. ORDERS RECEIVED NOTED AND 
CARRIED OUT. After fentanyl dose, encouraged patient to change positions frequently in bed to help laboring since unable to ambulate while on medication. Patient stated she is changing positions side to side in bed frequently.  also supportive of suggestion.

## 2020-07-24 VITALS — DIASTOLIC BLOOD PRESSURE: 55 MMHG | SYSTOLIC BLOOD PRESSURE: 102 MMHG

## 2020-07-24 VITALS — DIASTOLIC BLOOD PRESSURE: 69 MMHG | SYSTOLIC BLOOD PRESSURE: 110 MMHG

## 2020-07-24 VITALS — SYSTOLIC BLOOD PRESSURE: 115 MMHG | DIASTOLIC BLOOD PRESSURE: 67 MMHG

## 2020-07-24 VITALS — SYSTOLIC BLOOD PRESSURE: 110 MMHG | DIASTOLIC BLOOD PRESSURE: 65 MMHG

## 2020-07-24 VITALS — DIASTOLIC BLOOD PRESSURE: 52 MMHG | SYSTOLIC BLOOD PRESSURE: 101 MMHG

## 2020-07-24 RX ADMIN — CEPHALEXIN SCH MG: 500 CAPSULE ORAL at 01:20

## 2020-07-24 RX ADMIN — LITHIUM CARBONATE SCH MG: 300 TABLET, FILM COATED, EXTENDED RELEASE ORAL at 17:27

## 2020-07-24 RX ADMIN — CEPHALEXIN SCH MG: 500 CAPSULE ORAL at 21:02

## 2020-07-24 RX ADMIN — CEPHALEXIN SCH MG: 500 CAPSULE ORAL at 08:15

## 2020-07-24 RX ADMIN — OLANZAPINE SCH MG: 5 TABLET, FILM COATED ORAL at 17:27

## 2020-07-24 NOTE — NUR
GPS RN ADMISSION NOTES:



ADMITTED A 65 YO FEMALE FROM [facility] ON 5150 HOLD FOR GRAVE DISABILITY. PER 
HOLD, PATIENT WAS YELLING MOTHER SAMEER, BEING DISRUPTIVE TO OTHER RESIDENTS FROM PREVIOUS 
SNF, NON COMPLIANT WITH CARE. PATIENT HAS POOR INSIGHT, POOR IMPULSE CONTROL. PATIENT WILL 
BE UNDER THE CARE OF DR CENTENO AND JOVI LEYVA NP FOR [employer] [facility]. PATIENT THEN 
ADVISED OF THE HOLD.



UPON FACE TO FACE EVALUATION, PATIENT PRESENTS AS ALERT AND ORIENTED X1, APPEARS UNKEMPT, 
DISHEVELED, REFUSING TO TALK EXTENSIVELY WHEN ASKED DURING INTERVIEW. SKIN AND BODY 
ASSESSMENT CHECKED- PATIENT HAS NO ACTIVE OR OPEN WOUNDS NOTED AT THIS TIME. NOTED THE 
YELLOW COLOR OF HER TOENAILS. PICTURES TAKEN AND PLACED IN THE CHART. PATIENT REFUSED TO 
SIGN THE ADMISSION PAPERS. BELONGINGS AND CONTRABAND CHECKED. GUIDE TO PRESCRIPTION 
MEDICATIONS BOOKLET AND PATIENT'S RIGHT'S HANDBOOK  PROVIDED. Q15 MIN CHECKS INITIATED. CARE 
PLAN SPECIFIC FOR PATIENT'S NEEDS STARTED.



CHARGE RN DOUBLE CHECKED WITH LAB STAFF REGARDING PATIENT'S COVID TESTING RESULT AS IT DOES 
NOT SHOW ANY RESULTS IN THE COMPUTER.  JULIA ARAGON RN SPOKE WITH LETICIA FROM LAB AND OBTAINED 
THE INFORMATION THAT PATIENT TESTED NEGATIVE WITH THE RAPID COVID TESTING HERE AT [facility]. 



WILL MONITOR PATIENT FOR MOOD, SAFETY AND BEHAVIOR.

## 2020-07-24 NOTE — NUR
Social Work Family Contact: This writer contacted patient's brother Harry ([phone]) 
and left a voicemail for this writer to contact back.

## 2020-07-24 NOTE — NUR
GROUP NOTE:  invited pt to participate in group. Pt was unable to have a 
meaningful conversation with this writer due to her disorganized thought content. Pt refused 
to join.

## 2020-07-24 NOTE — NUR
Social Work Initial Discharge: Patient currently resides at [facility] [address], [place] [address]; ([phone]). This writer spoke with Isiah Barrientos 
([phone]) who stated that patient is welcomed back upon discharge. This writer 
contacted patient's brother Harry ([phone]) but was unavailable and left a voicemail. 
 will work with the patient and the MD regarding appropriate discharge 
planning.  will form a safe and proper discharge plan.

## 2020-07-25 VITALS — DIASTOLIC BLOOD PRESSURE: 65 MMHG | SYSTOLIC BLOOD PRESSURE: 115 MMHG

## 2020-07-25 VITALS — SYSTOLIC BLOOD PRESSURE: 98 MMHG | DIASTOLIC BLOOD PRESSURE: 50 MMHG

## 2020-07-25 VITALS — DIASTOLIC BLOOD PRESSURE: 64 MMHG | SYSTOLIC BLOOD PRESSURE: 104 MMHG

## 2020-07-25 VITALS — DIASTOLIC BLOOD PRESSURE: 71 MMHG | SYSTOLIC BLOOD PRESSURE: 125 MMHG

## 2020-07-25 VITALS — DIASTOLIC BLOOD PRESSURE: 63 MMHG | SYSTOLIC BLOOD PRESSURE: 112 MMHG

## 2020-07-25 LAB
BASOPHILS # BLD AUTO: 0 /CMM (ref 0–0.2)
BASOPHILS NFR BLD AUTO: 0.4 % (ref 0–2)
BUN SERPL-MCNC: 14 MG/DL (ref 7–18)
CALCIUM SERPL-MCNC: 9.3 MG/DL (ref 8.5–10.1)
CHLORIDE SERPL-SCNC: 107 MMOL/L (ref 98–107)
CHOLEST SERPL-MCNC: 146 MG/DL (ref ?–200)
CO2 SERPL-SCNC: 25 MMOL/L (ref 21–32)
CREAT SERPL-MCNC: 0.7 MG/DL (ref 0.6–1.3)
EOSINOPHIL NFR BLD AUTO: 2.4 % (ref 0–6)
GLUCOSE SERPL-MCNC: 93 MG/DL (ref 74–106)
HCT VFR BLD AUTO: 40 % (ref 33–45)
HDLC SERPL-MCNC: 48 MG/DL (ref 40–60)
HGB BLD-MCNC: 13.1 G/DL (ref 11.5–14.8)
LDLC SERPL DIRECT ASSAY-MCNC: 82 MG/DL (ref 0–99)
LYMPHOCYTES NFR BLD AUTO: 2 /CMM (ref 0.8–4.8)
LYMPHOCYTES NFR BLD AUTO: 27.9 % (ref 20–44)
MCHC RBC AUTO-ENTMCNC: 33 G/DL (ref 31–36)
MCV RBC AUTO: 94 FL (ref 82–100)
MONOCYTES NFR BLD AUTO: 0.8 /CMM (ref 0.1–1.3)
MONOCYTES NFR BLD AUTO: 10.9 % (ref 2–12)
NEUTROPHILS # BLD AUTO: 4.1 /CMM (ref 1.8–8.9)
NEUTROPHILS NFR BLD AUTO: 58.4 % (ref 43–81)
PLATELET # BLD AUTO: 312 /CMM (ref 150–450)
POTASSIUM SERPL-SCNC: 4.3 MMOL/L (ref 3.5–5.1)
RBC # BLD AUTO: 4.29 MIL/UL (ref 4–5.2)
SODIUM SERPL-SCNC: 140 MMOL/L (ref 136–145)
TRIGL SERPL-MCNC: 125 MG/DL (ref 30–150)
WBC NRBC COR # BLD AUTO: 7.1 K/UL (ref 4.3–11)

## 2020-07-25 RX ADMIN — OLANZAPINE SCH MG: 5 TABLET, FILM COATED ORAL at 05:49

## 2020-07-25 RX ADMIN — LITHIUM CARBONATE SCH MG: 300 TABLET, FILM COATED, EXTENDED RELEASE ORAL at 12:09

## 2020-07-25 RX ADMIN — CEPHALEXIN SCH MG: 500 CAPSULE ORAL at 07:50

## 2020-07-25 RX ADMIN — OLANZAPINE SCH MG: 5 TABLET, FILM COATED ORAL at 16:38

## 2020-07-25 RX ADMIN — LITHIUM CARBONATE SCH MG: 300 TABLET, FILM COATED, EXTENDED RELEASE ORAL at 16:38

## 2020-07-25 RX ADMIN — CEPHALEXIN SCH MG: 500 CAPSULE ORAL at 21:20

## 2020-07-25 RX ADMIN — LITHIUM CARBONATE SCH MG: 300 TABLET, FILM COATED, EXTENDED RELEASE ORAL at 07:51

## 2020-07-26 VITALS — DIASTOLIC BLOOD PRESSURE: 63 MMHG | SYSTOLIC BLOOD PRESSURE: 113 MMHG

## 2020-07-26 VITALS — SYSTOLIC BLOOD PRESSURE: 110 MMHG | DIASTOLIC BLOOD PRESSURE: 70 MMHG

## 2020-07-26 VITALS — SYSTOLIC BLOOD PRESSURE: 111 MMHG | DIASTOLIC BLOOD PRESSURE: 72 MMHG

## 2020-07-26 RX ADMIN — OLANZAPINE SCH MG: 5 TABLET, FILM COATED ORAL at 05:14

## 2020-07-26 RX ADMIN — OLANZAPINE SCH MG: 5 TABLET, FILM COATED ORAL at 20:08

## 2020-07-26 RX ADMIN — LITHIUM CARBONATE SCH MG: 300 TABLET, FILM COATED, EXTENDED RELEASE ORAL at 16:40

## 2020-07-26 RX ADMIN — CEPHALEXIN SCH MG: 500 CAPSULE ORAL at 20:09

## 2020-07-26 RX ADMIN — LITHIUM CARBONATE SCH MG: 300 TABLET, FILM COATED, EXTENDED RELEASE ORAL at 07:49

## 2020-07-26 RX ADMIN — LITHIUM CARBONATE SCH MG: 300 TABLET, FILM COATED, EXTENDED RELEASE ORAL at 12:12

## 2020-07-26 RX ADMIN — CEPHALEXIN SCH MG: 500 CAPSULE ORAL at 07:49

## 2020-07-27 VITALS — SYSTOLIC BLOOD PRESSURE: 101 MMHG | DIASTOLIC BLOOD PRESSURE: 49 MMHG

## 2020-07-27 VITALS — DIASTOLIC BLOOD PRESSURE: 72 MMHG | SYSTOLIC BLOOD PRESSURE: 105 MMHG

## 2020-07-27 VITALS — SYSTOLIC BLOOD PRESSURE: 109 MMHG | DIASTOLIC BLOOD PRESSURE: 69 MMHG

## 2020-07-27 RX ADMIN — OLANZAPINE SCH MG: 5 TABLET, FILM COATED ORAL at 19:54

## 2020-07-27 RX ADMIN — OLANZAPINE SCH MG: 5 TABLET, FILM COATED ORAL at 12:16

## 2020-07-27 RX ADMIN — OLANZAPINE SCH MG: 5 TABLET, FILM COATED ORAL at 08:08

## 2020-07-27 RX ADMIN — CEPHALEXIN SCH MG: 500 CAPSULE ORAL at 21:01

## 2020-07-27 RX ADMIN — LITHIUM CARBONATE SCH MG: 300 TABLET, FILM COATED, EXTENDED RELEASE ORAL at 08:08

## 2020-07-27 RX ADMIN — CEPHALEXIN SCH MG: 500 CAPSULE ORAL at 08:08

## 2020-07-27 RX ADMIN — LITHIUM CARBONATE SCH MG: 300 TABLET, FILM COATED, EXTENDED RELEASE ORAL at 17:15

## 2020-07-27 RX ADMIN — LITHIUM CARBONATE SCH MG: 300 TABLET, FILM COATED, EXTENDED RELEASE ORAL at 12:16

## 2020-07-27 NOTE — NUR
RN NOTE- PT QUIET CONFUSED MED COMPLIANT GUARDED AND WARY...  DOES RESPOND TO QUERY SLOW 
VERBAL INTERACTION MED COMPLIANT DENIES ALL PO INTAKE GOOD

## 2020-07-27 NOTE — NUR
Social Work Family Contact: This writer contacted patient's brother Harry ([phone]) 
and left a voicemail for this writer to contact back.

## 2020-07-28 VITALS — SYSTOLIC BLOOD PRESSURE: 98 MMHG | DIASTOLIC BLOOD PRESSURE: 53 MMHG

## 2020-07-28 VITALS — DIASTOLIC BLOOD PRESSURE: 64 MMHG | SYSTOLIC BLOOD PRESSURE: 113 MMHG

## 2020-07-28 VITALS — SYSTOLIC BLOOD PRESSURE: 122 MMHG | DIASTOLIC BLOOD PRESSURE: 60 MMHG

## 2020-07-28 RX ADMIN — OLANZAPINE SCH MG: 10 TABLET ORAL at 21:16

## 2020-07-28 RX ADMIN — OLANZAPINE SCH MG: 5 TABLET, FILM COATED ORAL at 08:21

## 2020-07-28 RX ADMIN — LITHIUM CARBONATE SCH MG: 300 TABLET, FILM COATED, EXTENDED RELEASE ORAL at 16:31

## 2020-07-28 RX ADMIN — LITHIUM CARBONATE SCH MG: 300 TABLET, FILM COATED, EXTENDED RELEASE ORAL at 12:15

## 2020-07-28 RX ADMIN — CEPHALEXIN SCH MG: 500 CAPSULE ORAL at 08:21

## 2020-07-28 RX ADMIN — CEPHALEXIN SCH MG: 500 CAPSULE ORAL at 21:16

## 2020-07-28 RX ADMIN — LITHIUM CARBONATE SCH MG: 300 TABLET, FILM COATED, EXTENDED RELEASE ORAL at 08:21

## 2020-07-28 NOTE — NUR
GROUP NOTE: Group Topic: Depression

SW invited patient to attend group. Patient refused to join group despite efforts to engage 
with patient.

## 2020-07-28 NOTE — NUR
Social Work Family Contact: This writer spoke patient's brother Harry ([phone]) 
regarding treatment and discharge plan. Harry was concerned about the patient being able to 
return to [facility], however this writer assured that the patient will be 
returning to her SNF upon discharge.

## 2020-07-28 NOTE — NUR
RN NOTE- MED COMPLIANT GUARDED AND WARY...  DOES RESPOND TO QUERY SLOW VERBAL INTERACTION  
DENIES ALL PO INTAKE GOOD

## 2020-07-29 VITALS — SYSTOLIC BLOOD PRESSURE: 114 MMHG | DIASTOLIC BLOOD PRESSURE: 62 MMHG

## 2020-07-29 VITALS — SYSTOLIC BLOOD PRESSURE: 116 MMHG | DIASTOLIC BLOOD PRESSURE: 47 MMHG

## 2020-07-29 VITALS — SYSTOLIC BLOOD PRESSURE: 110 MMHG | DIASTOLIC BLOOD PRESSURE: 68 MMHG

## 2020-07-29 VITALS — SYSTOLIC BLOOD PRESSURE: 119 MMHG | DIASTOLIC BLOOD PRESSURE: 58 MMHG

## 2020-07-29 RX ADMIN — LITHIUM CARBONATE SCH MG: 300 TABLET, FILM COATED, EXTENDED RELEASE ORAL at 16:33

## 2020-07-29 RX ADMIN — CEPHALEXIN SCH MG: 500 CAPSULE ORAL at 08:04

## 2020-07-29 RX ADMIN — LITHIUM CARBONATE SCH MG: 300 TABLET, FILM COATED, EXTENDED RELEASE ORAL at 12:09

## 2020-07-29 RX ADMIN — OLANZAPINE SCH MG: 5 TABLET, FILM COATED ORAL at 08:04

## 2020-07-29 RX ADMIN — OLANZAPINE SCH MG: 10 TABLET ORAL at 21:09

## 2020-07-29 RX ADMIN — CEPHALEXIN SCH MG: 500 CAPSULE ORAL at 20:34

## 2020-07-29 RX ADMIN — LITHIUM CARBONATE SCH MG: 300 TABLET, FILM COATED, EXTENDED RELEASE ORAL at 08:04

## 2020-07-29 NOTE — NUR
RN NOTE- PT QUIET WITHDRAWN PARANOID AND WARY DENIES SI HI AH VH AT PRESENT THOUGH SEEMS 
PREOCCUPIED MED COMPLIANT

## 2020-07-29 NOTE — NUR
Group Note: SW encouraged the pt to attend group therapy on 7/29/20 on the topic of 
discharge planning. Pt stated that she did not want to participate in group and wanted to 
continue walking around the unit. Pt also stated that she did not want to speak to anyone 
who was not on her case.

## 2020-07-30 VITALS — SYSTOLIC BLOOD PRESSURE: 97 MMHG | DIASTOLIC BLOOD PRESSURE: 64 MMHG

## 2020-07-30 VITALS — SYSTOLIC BLOOD PRESSURE: 98 MMHG | DIASTOLIC BLOOD PRESSURE: 51 MMHG

## 2020-07-30 VITALS — DIASTOLIC BLOOD PRESSURE: 61 MMHG | SYSTOLIC BLOOD PRESSURE: 114 MMHG

## 2020-07-30 RX ADMIN — CEPHALEXIN SCH MG: 500 CAPSULE ORAL at 09:23

## 2020-07-30 RX ADMIN — SERTRALINE HYDROCHLORIDE SCH MG: 50 TABLET, FILM COATED ORAL at 08:45

## 2020-07-30 RX ADMIN — OLANZAPINE SCH MG: 10 TABLET ORAL at 21:49

## 2020-07-30 RX ADMIN — CEPHALEXIN SCH MG: 500 CAPSULE ORAL at 21:49

## 2020-07-30 RX ADMIN — LITHIUM CARBONATE SCH MG: 300 TABLET, FILM COATED, EXTENDED RELEASE ORAL at 12:29

## 2020-07-30 RX ADMIN — LITHIUM CARBONATE SCH MG: 300 TABLET, FILM COATED, EXTENDED RELEASE ORAL at 08:45

## 2020-07-30 RX ADMIN — OLANZAPINE SCH MG: 5 TABLET, FILM COATED ORAL at 08:45

## 2020-07-30 RX ADMIN — LITHIUM CARBONATE SCH MG: 300 TABLET, FILM COATED, EXTENDED RELEASE ORAL at 16:26

## 2020-07-30 NOTE — NUR
GROUP NOTE: Topic: Adapting to our environment.  encouraged patient to 
participate in group therapy. Patient presented withdrawn and did not want to engage with 
this writer or peers at this time. Patient wanted to continue to lay in bed.

## 2020-07-31 VITALS — DIASTOLIC BLOOD PRESSURE: 49 MMHG | SYSTOLIC BLOOD PRESSURE: 101 MMHG

## 2020-07-31 VITALS — SYSTOLIC BLOOD PRESSURE: 104 MMHG | DIASTOLIC BLOOD PRESSURE: 60 MMHG

## 2020-07-31 VITALS — SYSTOLIC BLOOD PRESSURE: 100 MMHG | DIASTOLIC BLOOD PRESSURE: 58 MMHG

## 2020-07-31 VITALS — SYSTOLIC BLOOD PRESSURE: 103 MMHG | DIASTOLIC BLOOD PRESSURE: 64 MMHG

## 2020-07-31 RX ADMIN — OLANZAPINE SCH MG: 10 TABLET ORAL at 21:29

## 2020-07-31 RX ADMIN — OLANZAPINE SCH MG: 5 TABLET, FILM COATED ORAL at 08:08

## 2020-07-31 RX ADMIN — LITHIUM CARBONATE SCH MG: 300 TABLET, FILM COATED, EXTENDED RELEASE ORAL at 08:07

## 2020-07-31 RX ADMIN — CEPHALEXIN SCH MG: 500 CAPSULE ORAL at 08:08

## 2020-07-31 RX ADMIN — CEPHALEXIN SCH MG: 500 CAPSULE ORAL at 20:03

## 2020-07-31 RX ADMIN — SERTRALINE HYDROCHLORIDE SCH MG: 50 TABLET, FILM COATED ORAL at 08:08

## 2020-07-31 RX ADMIN — LITHIUM CARBONATE SCH MG: 300 TABLET, FILM COATED, EXTENDED RELEASE ORAL at 13:24

## 2020-07-31 RX ADMIN — LITHIUM CARBONATE SCH MG: 300 TABLET, FILM COATED, EXTENDED RELEASE ORAL at 16:09

## 2020-07-31 NOTE — NUR
GROUP NOTE: Topic: Learning coping skills. Patient shared about her wanting to go home. Pt 
stated she is feeling "okay. and tired". SW provided empathetic understanding. Patient did 
not want to engage in conversation any longer.

## 2020-07-31 NOTE — NUR
GPS RN NOTE



PATIENT HAD SNACK & PO FLUIDS, TOLERATED WELL. NO CHANGES NOTED. DUE MEDS GIVEN & TOLERATED 
WELL. WILL CONTINUE TO MONITOR FOR ANY CHANGES.

## 2020-08-01 VITALS — SYSTOLIC BLOOD PRESSURE: 91 MMHG | DIASTOLIC BLOOD PRESSURE: 41 MMHG

## 2020-08-01 VITALS — DIASTOLIC BLOOD PRESSURE: 50 MMHG | SYSTOLIC BLOOD PRESSURE: 104 MMHG

## 2020-08-01 VITALS — DIASTOLIC BLOOD PRESSURE: 57 MMHG | SYSTOLIC BLOOD PRESSURE: 118 MMHG

## 2020-08-01 RX ADMIN — LITHIUM CARBONATE SCH MG: 300 TABLET, FILM COATED, EXTENDED RELEASE ORAL at 16:49

## 2020-08-01 RX ADMIN — LITHIUM CARBONATE SCH MG: 300 TABLET, FILM COATED, EXTENDED RELEASE ORAL at 08:07

## 2020-08-01 RX ADMIN — CEPHALEXIN SCH MG: 500 CAPSULE ORAL at 21:19

## 2020-08-01 RX ADMIN — CEPHALEXIN SCH MG: 500 CAPSULE ORAL at 08:07

## 2020-08-01 RX ADMIN — OLANZAPINE SCH MG: 10 TABLET ORAL at 21:51

## 2020-08-01 RX ADMIN — SERTRALINE HYDROCHLORIDE SCH MG: 50 TABLET, FILM COATED ORAL at 08:07

## 2020-08-01 RX ADMIN — LITHIUM CARBONATE SCH MG: 300 TABLET, FILM COATED, EXTENDED RELEASE ORAL at 12:20

## 2020-08-01 NOTE — NUR
GPS RN NOTE:



HELD SCHEDULED 2200 ZYPREXA 10 MG PO DUE TO DECREASED BLOOD PRESSURE 98/48. WILL CONTINUE TO 
MONITOR FOR SAFETY AND BEHAVIOR

## 2020-08-02 VITALS — DIASTOLIC BLOOD PRESSURE: 70 MMHG | SYSTOLIC BLOOD PRESSURE: 103 MMHG

## 2020-08-02 VITALS — SYSTOLIC BLOOD PRESSURE: 103 MMHG | DIASTOLIC BLOOD PRESSURE: 72 MMHG

## 2020-08-02 VITALS — SYSTOLIC BLOOD PRESSURE: 108 MMHG | DIASTOLIC BLOOD PRESSURE: 61 MMHG

## 2020-08-02 RX ADMIN — CEPHALEXIN SCH MG: 500 CAPSULE ORAL at 21:11

## 2020-08-02 RX ADMIN — LITHIUM CARBONATE SCH MG: 300 TABLET, FILM COATED, EXTENDED RELEASE ORAL at 12:37

## 2020-08-02 RX ADMIN — LITHIUM CARBONATE SCH MG: 300 TABLET, FILM COATED, EXTENDED RELEASE ORAL at 08:11

## 2020-08-02 RX ADMIN — CEPHALEXIN SCH MG: 500 CAPSULE ORAL at 08:11

## 2020-08-02 RX ADMIN — LITHIUM CARBONATE SCH MG: 300 TABLET, FILM COATED, EXTENDED RELEASE ORAL at 17:25

## 2020-08-02 RX ADMIN — SERTRALINE HYDROCHLORIDE SCH MG: 50 TABLET, FILM COATED ORAL at 08:11

## 2020-08-02 RX ADMIN — OLANZAPINE SCH MG: 10 TABLET ORAL at 21:11

## 2020-08-03 VITALS — SYSTOLIC BLOOD PRESSURE: 108 MMHG | DIASTOLIC BLOOD PRESSURE: 69 MMHG

## 2020-08-03 VITALS — DIASTOLIC BLOOD PRESSURE: 45 MMHG | SYSTOLIC BLOOD PRESSURE: 101 MMHG

## 2020-08-03 VITALS — SYSTOLIC BLOOD PRESSURE: 106 MMHG | DIASTOLIC BLOOD PRESSURE: 64 MMHG

## 2020-08-03 RX ADMIN — CEPHALEXIN SCH MG: 500 CAPSULE ORAL at 21:04

## 2020-08-03 RX ADMIN — LITHIUM CARBONATE SCH MG: 300 TABLET, FILM COATED, EXTENDED RELEASE ORAL at 16:34

## 2020-08-03 RX ADMIN — SERTRALINE HYDROCHLORIDE SCH MG: 50 TABLET, FILM COATED ORAL at 08:39

## 2020-08-03 RX ADMIN — CEPHALEXIN SCH MG: 500 CAPSULE ORAL at 08:39

## 2020-08-03 RX ADMIN — LITHIUM CARBONATE SCH MG: 300 TABLET, FILM COATED, EXTENDED RELEASE ORAL at 08:39

## 2020-08-03 RX ADMIN — OLANZAPINE SCH MG: 10 TABLET ORAL at 22:00

## 2020-08-03 RX ADMIN — LITHIUM CARBONATE SCH MG: 300 TABLET, FILM COATED, EXTENDED RELEASE ORAL at 12:18

## 2020-08-03 NOTE — NUR
MONY Discharge Planning:



MONY spoke with Viola admissions coordinator at [facility] ([phone]) and 
informed of the patient's discharge plan for tomorrow back to their facility. MONY faxed Viola 
the patient's progress notes, medications list, and negative COVID test results. MONY also 
called and left a voicemail for patient's brother, Harry ([phone]) to informed of the 
patient's discharge plan for tomorrow as well.

## 2020-08-03 NOTE — NUR
Group Note:



SW invited patient to participate in group therapy to discuss the topic of Problem Solving. 
Patient was asleep at this time and unable to participate in group.

## 2020-08-04 VITALS — DIASTOLIC BLOOD PRESSURE: 62 MMHG | SYSTOLIC BLOOD PRESSURE: 113 MMHG

## 2020-08-04 RX ADMIN — SERTRALINE HYDROCHLORIDE SCH MG: 50 TABLET, FILM COATED ORAL at 08:27

## 2020-08-04 RX ADMIN — LITHIUM CARBONATE SCH MG: 300 TABLET, FILM COATED, EXTENDED RELEASE ORAL at 12:33

## 2020-08-04 RX ADMIN — CEPHALEXIN SCH MG: 500 CAPSULE ORAL at 08:27

## 2020-08-04 RX ADMIN — LITHIUM CARBONATE SCH MG: 300 TABLET, FILM COATED, EXTENDED RELEASE ORAL at 08:27

## 2020-08-04 NOTE — NUR
RN NOTE- PT IN BED AWAKE ORIENTED TO PERSON PLACE TIME. ANTICIPATING DC, NEEDS ATTENDED GOOD 
YE CONTACT INITIATES CONVERSATION DENIES SI HI AH VH AT PRESENT VS STABLE MED COMPLIANT

## 2020-08-04 NOTE — NUR
Discharge Note:

Patient will be discharged today to [facility] [address] [place] 
[address] ([phone]). Patient will be provided ambulance transportation at 12PM. Spoke with 
Viola admissions coordinator ([phone]) at the facility who states they are ready to 
accept the patient today. Patient is alert and oriented x2-3 and is not able to plan for 
self-care at this time but is willing to accept care provided for her at the facility. 
Patient denies any suicidal or homicidal ideation. Patient is aware and agreeable with 
discharge plans. Patient will continue to follow-up with her Psychiatrist Dr. Heath and 
Internist Dr. Wen at [facility] [address] [place] [address] 
([phone]). Patient presents with euthymic mood and congruent affect. Patients brotherHarry ([phone]) is made aware and is agreeable with discharge plan.

## 2020-08-04 NOTE — NUR
RN DC NOTE- PT DC THIS TIME VIA AMBULANCE SERVICE TO [facility]. DC INSTRUCTIONS 
AND AFTERCARE REPORT CALLED TO FACILITY. VERBALIZED UNDERSTANDING RETURNED. MED ORDERS 
GIVEN. PT VS STABLE, DENIES SI HI AH VH AT PRESENT VALUABLES RETURNED TO PT ID [email or web address] 
REMOVED . ASSISTED OFF UNIT BY STAFF ON [facility]. NO PHOTOS NEEDED SKIN INTACT.